# Patient Record
Sex: MALE | Race: WHITE | NOT HISPANIC OR LATINO | URBAN - METROPOLITAN AREA
[De-identification: names, ages, dates, MRNs, and addresses within clinical notes are randomized per-mention and may not be internally consistent; named-entity substitution may affect disease eponyms.]

---

## 2021-01-30 ENCOUNTER — EMERGENCY (EMERGENCY)
Facility: HOSPITAL | Age: 31
LOS: 1 days | Discharge: HOME | End: 2021-01-30
Attending: EMERGENCY MEDICINE | Admitting: EMERGENCY MEDICINE
Payer: SELF-PAY

## 2021-01-30 VITALS
WEIGHT: 160.06 LBS | HEART RATE: 81 BPM | SYSTOLIC BLOOD PRESSURE: 134 MMHG | DIASTOLIC BLOOD PRESSURE: 86 MMHG | TEMPERATURE: 98 F | HEIGHT: 66 IN | OXYGEN SATURATION: 99 % | RESPIRATION RATE: 15 BRPM

## 2021-01-30 VITALS
RESPIRATION RATE: 16 BRPM | SYSTOLIC BLOOD PRESSURE: 116 MMHG | DIASTOLIC BLOOD PRESSURE: 73 MMHG | OXYGEN SATURATION: 98 % | HEART RATE: 79 BPM | TEMPERATURE: 99 F

## 2021-01-30 DIAGNOSIS — Z98.890 OTHER SPECIFIED POSTPROCEDURAL STATES: Chronic | ICD-10-CM

## 2021-01-30 PROCEDURE — 72040 X-RAY EXAM NECK SPINE 2-3 VW: CPT | Mod: 26

## 2021-01-30 PROCEDURE — 99283 EMERGENCY DEPT VISIT LOW MDM: CPT | Mod: 25

## 2021-01-30 PROCEDURE — 99053 MED SERV 10PM-8AM 24 HR FAC: CPT

## 2021-01-30 PROCEDURE — 96372 THER/PROPH/DIAG INJ SC/IM: CPT

## 2021-01-30 PROCEDURE — 72040 X-RAY EXAM NECK SPINE 2-3 VW: CPT

## 2021-01-30 PROCEDURE — 99284 EMERGENCY DEPT VISIT MOD MDM: CPT

## 2021-01-30 RX ORDER — KETOROLAC TROMETHAMINE 30 MG/ML
60 SYRINGE (ML) INJECTION ONCE
Refills: 0 | Status: DISCONTINUED | OUTPATIENT
Start: 2021-01-30 | End: 2021-01-30

## 2021-01-30 RX ORDER — LIDOCAINE 4 G/100G
1 CREAM TOPICAL ONCE
Refills: 0 | Status: COMPLETED | OUTPATIENT
Start: 2021-01-30 | End: 2021-01-30

## 2021-01-30 RX ORDER — CYCLOBENZAPRINE HYDROCHLORIDE 10 MG/1
1 TABLET, FILM COATED ORAL
Qty: 30 | Refills: 0
Start: 2021-01-30

## 2021-01-30 RX ORDER — DEXAMETHASONE 0.5 MG/5ML
6 ELIXIR ORAL ONCE
Refills: 0 | Status: COMPLETED | OUTPATIENT
Start: 2021-01-30 | End: 2021-01-30

## 2021-01-30 RX ORDER — DIAZEPAM 5 MG
5 TABLET ORAL ONCE
Refills: 0 | Status: DISCONTINUED | OUTPATIENT
Start: 2021-01-30 | End: 2021-01-30

## 2021-01-30 RX ORDER — IBUPROFEN 200 MG
1 TABLET ORAL
Qty: 30 | Refills: 0
Start: 2021-01-30

## 2021-01-30 RX ADMIN — Medication 6 MILLIGRAM(S): at 09:02

## 2021-01-30 RX ADMIN — LIDOCAINE 1 PATCH: 4 CREAM TOPICAL at 07:35

## 2021-01-30 RX ADMIN — Medication 5 MILLIGRAM(S): at 07:50

## 2021-01-30 RX ADMIN — Medication 60 MILLIGRAM(S): at 07:35

## 2021-01-30 NOTE — ED PROVIDER NOTE - MUSCULOSKELETAL, MLM
pt has marked torticollis of cervcial spine without spasm or neurologic dysfunction, the remainder of the spine appears normal, range of motion is not limited, no muscle or joint tenderness

## 2021-01-30 NOTE — ED PROVIDER NOTE - IV ALTEPLASE EXCL ABS HIDDEN
Patient informed that Dr. Ferreira [facility] office entered a referral and it was authorized for 3 visits. Insurance will most likely cancel our referral since it is a duplicate. She verbalized understanding. No further questions or concerns. show

## 2021-01-30 NOTE — ED PROVIDER NOTE - CONSTITUTIONAL, MLM
Well appearing, awake, alert, oriented to person, place, time/situation and in no apparent distress but walking into exam room with head tilted to right shoulder normal...

## 2021-01-30 NOTE — ED PROVIDER NOTE - PATIENT PORTAL LINK FT
You can access the FollowMyHealth Patient Portal offered by [facility] by registering at the following website: [email or web address]. By joining [employer]’s FollowMyHealth portal, you will also be able to view your health information using other applications (apps) compatible with our system.

## 2021-01-30 NOTE — ED PROVIDER NOTE - NSFOLLOWUPINSTRUCTIONS_ED_ALL_ED_FT
Ibuprofen for pain  Flexeril for spasm (with caution)  use neck collar for comfort but not all the time  follow up with dr Lin- on call orthopedist  follow up with dr Cortes- medical doctor or clinic    Spasmodic Torticollis    WHAT YOU NEED TO KNOW:    Spasmodic torticollis, also called cervical dystonia, is a condition that causes your neck muscles to contract abnormally. Your neck may twist and cause your head to tilt to one side, forward, or backward. Spasmodic torticollis may occur occasionally or continuously. It often gets worse with stress.    DISCHARGE INSTRUCTIONS:    Medicines: You may need any of the following:   •Muscle relaxers decrease pain and muscle spasms.      •Botulinum toxin injections may also be given to relax your muscles.      •NSAIDs, such as ibuprofen, help decrease swelling, pain, and fever. This medicine is available with or without a doctor's order. NSAIDs can cause stomach bleeding or kidney problems in certain people. If you take blood thinner medicine, always ask if NSAIDs are safe for you. Always read the medicine label and follow directions. Do not give these medicines to children under 6 months of age without direction from your child's healthcare provider.      •Acetaminophen decreases pain. It is available without a doctor's order. Ask how much to take and how often to take it. Follow directions. Acetaminophen can cause liver damage if not taken correctly.      •Prescription pain medicine may be given. Ask your healthcare provider how to take this medicine safely.      •Take your medicine as directed. Contact your healthcare provider if you think your medicine is not helping or if you have side effects. Tell him if you are allergic to any medicine. Keep a list of the medicines, vitamins, and herbs you take. Include the amounts, and when and why you take them. Bring the list or the pill bottles to follow-up visits. Carry your medicine list with you in case of an emergency.      Follow up with your healthcare provider as directed: Write down your questions so you remember to ask them during your visits.    Self-care:   •Apply ice on your neck for 15 to 20 minutes every hour or as directed. Use an ice pack, or put crushed ice in a plastic bag. Cover it with a towel. Ice helps prevent tissue damage and decreases swelling and pain.      •Apply heat on your neck for 20 to 30 minutes every 2 hours for as many days as directed. Heat helps decrease pain and muscle spasms.      •Rest as needed. Return to your daily activities as directed.       •Use cervical collar as directed. A cervical collar may be needed to support your neck.       Contact your healthcare provider if:   •You have a fever.       •You have swelling in your neck area that gets worse or does not go away.      •You have an increased feeling of sadness or loneliness, or you feel depressed.      •You have questions or concerns about your condition or care.      Return to the emergency department if:   •You have increased pain in your neck or shoulder.      •You have sudden shortness of breath.       •You have trouble moving your arms or legs.      •Your arms or legs feel numb.

## 2021-01-30 NOTE — ED PROVIDER NOTE - OBJECTIVE STATEMENT
pt is a 31 yo male who was at home working on his computer all day.  Around 4 pm he developed a crick type pain in his neck and soon was unable to move his head.  he was unable to sleep and had continued pain needing to keep his head tilted to the right.  he tried a heating pad (caused skin irritation) and is also now complaining of pain into left arm from keeping head tilted.  he denies trauma illness injury weakness numbness tingling or other symptoms  no hx of mvc

## 2021-01-30 NOTE — ED PROVIDER NOTE - CARE PROVIDER_API CALL
Miguel Lin)  Orthopaedic Surgery  [address]  [place] [address]  Phone: [phone]  Fax: [phone]  Follow Up Time:     Dory Cortes)  Internal Medicine  [address] [place] [address]  Phone: [phone]  Fax: [phone]  Follow Up Time:

## 2021-01-30 NOTE — ED ADULT NURSE NOTE - OBJECTIVE STATEMENT
pt with complaints of left sided neck and upper back pain that started at approx 3 pm yesterday. pt took advil without relief. pt head turned to right side, unable to move it

## 2021-01-30 NOTE — ED PROVIDER NOTE - SKIN, MLM
Skin normal color for race, warm, dry and intact. No evidence of rash. except to area of left lateral neck where pt held heating pad. causing raised red area

## 2021-01-30 NOTE — ED PROVIDER NOTE - PROGRESS NOTE DETAILS
pt appears a little straighter in the bed but on re-evaluation he has sig pain unable to lay flat or pick head up off bed.  will continue to monitor and xray improved wants to go home

## 2021-01-30 NOTE — ED PROVIDER NOTE - CHPI ED SYMPTOMS NEG
no anorexia/no bladder dysfunction/no bowel dysfunction/no constipation/no fatigue/no difficulty bearing weight/no motor function loss

## 2022-11-11 PROBLEM — Z00.00 ENCOUNTER FOR PREVENTIVE HEALTH EXAMINATION: Status: ACTIVE | Noted: 2022-11-11
